# Patient Record
Sex: FEMALE | Race: WHITE
[De-identification: names, ages, dates, MRNs, and addresses within clinical notes are randomized per-mention and may not be internally consistent; named-entity substitution may affect disease eponyms.]

---

## 2018-05-29 ENCOUNTER — HOSPITAL ENCOUNTER (EMERGENCY)
Dept: HOSPITAL 39 - ER | Age: 68
Discharge: HOME | End: 2018-05-29
Payer: COMMERCIAL

## 2018-05-29 VITALS — OXYGEN SATURATION: 98 % | DIASTOLIC BLOOD PRESSURE: 73 MMHG | SYSTOLIC BLOOD PRESSURE: 176 MMHG

## 2018-05-29 VITALS — TEMPERATURE: 97.3 F

## 2018-05-29 DIAGNOSIS — R11.0: ICD-10-CM

## 2018-05-29 DIAGNOSIS — Z86.73: ICD-10-CM

## 2018-05-29 DIAGNOSIS — R10.13: Primary | ICD-10-CM

## 2018-05-29 DIAGNOSIS — Z87.891: ICD-10-CM

## 2018-05-29 DIAGNOSIS — I10: ICD-10-CM

## 2018-05-29 DIAGNOSIS — Z85.51: ICD-10-CM

## 2018-05-29 PROCEDURE — 85730 THROMBOPLASTIN TIME PARTIAL: CPT

## 2018-05-29 PROCEDURE — 82553 CREATINE MB FRACTION: CPT

## 2018-05-29 PROCEDURE — 84484 ASSAY OF TROPONIN QUANT: CPT

## 2018-05-29 PROCEDURE — 93005 ELECTROCARDIOGRAM TRACING: CPT

## 2018-05-29 PROCEDURE — 85610 PROTHROMBIN TIME: CPT

## 2018-05-29 PROCEDURE — 82550 ASSAY OF CK (CPK): CPT

## 2018-05-29 PROCEDURE — 85025 COMPLETE CBC W/AUTO DIFF WBC: CPT

## 2018-05-29 PROCEDURE — 81001 URINALYSIS AUTO W/SCOPE: CPT

## 2018-05-29 PROCEDURE — 36415 COLL VENOUS BLD VENIPUNCTURE: CPT

## 2018-05-29 PROCEDURE — 74177 CT ABD & PELVIS W/CONTRAST: CPT

## 2018-05-29 PROCEDURE — 83690 ASSAY OF LIPASE: CPT

## 2018-05-29 PROCEDURE — 80076 HEPATIC FUNCTION PANEL: CPT

## 2018-05-29 PROCEDURE — 74019 RADEX ABDOMEN 2 VIEWS: CPT

## 2018-05-29 PROCEDURE — 80048 BASIC METABOLIC PNL TOTAL CA: CPT

## 2018-05-29 PROCEDURE — 80307 DRUG TEST PRSMV CHEM ANLYZR: CPT

## 2018-05-29 NOTE — RAD
EXAM DESCRIPTION: 



Abdomen Series



CLINICAL HISTORY: 



pain



COMPARISON: 



None.



FINDINGS: 



AP supine and upright views of the abdomen show a nonspecific,

nonobstructive bowel gas pattern with no evidence for free

intraperitoneal air.



Surgical clips from cholecystectomy are seen. 



No air-filled dilated loops of small bowel are seen. No

significant air-fluid levels are identified.

No obvious organomegaly is seen. No abnormal calcifications are

seen in the expected location of the renal collecting systems.



Single view of the chest shows cardiac silhouette and pulmonary

vasculature to be within normal limits. Lungs are normally

aerated and clear

. Remote appearing trauma to the right lower chest is seen.

Postsurgical changes to the lumbosacral spine are noted. Moderate

vascular calcifications are seen. Surgical suture line in the

lower pelvis is seen with surgical clips in the upper to left

pelvis.



IMPRESSION: 



Nonspecific abdominal series



Electronically signed by:  Hardy Rodgers MD  5/29/2018 2:23 PM CDT

Workstation: [id number]

## 2018-05-29 NOTE — ED.PDOC
History of Present Illness





- General


Chief Complaint: Abdominal Pain


Stated Complaint: abdominal pain


Time Seen by Provider: 05/29/18 13:33


Information Source: patient


Exam Limitations: no limitations





- History of Present Illness


Initial Comments: 





Veronique rushing 66 y/o female came to ER with worsening sharp constant epigastric 

pain for the last 3 days.Felt nauseated but no vomiting or diarrhea,regular BM ,

no dysuria,no hematemesis or melena .Pain non radiating .


Abdominal Pain Onset Location: epigastric


Pain Radiation: no radiation


Quality: sharpness, steady


Timing/Duration: other - 3 days


Improving Factors: nothing


Worsening Factors: nothing


Associated Symptoms: other - see hpi





Review of Systems





- Review of Systems


Constitutional: States: no symptoms reported


EENTM: States: no symptoms reported


Respiratory: States: no symptoms reported


Cardiology: States: no symptoms reported


Gastrointestinal/Abdominal: States: see HPI


Genitourinary: States: no symptoms reported


All other Systems: Reviewed and Negative, No Change from Baseline





Past Medical History (General)





- Patient Medical History


Hx Stroke: Yes


Hx Hypertension: Yes


Hx Cancer: Yes - bladder


Surgical History: tonsillectomy - back/neck, other





- Vaccination History


Hx Influenza Vaccination: No


Hx Pneumococcal Vaccination: No





- Social History


Hx Tobacco Use: Yes


Hx Alcohol Use: No





Family Medical History





- Family History


  ** Mother


Family History: Unknown


Hx Family Hypertension: Yes - multiple family members


Hx Cardiac Disease: Yes - multiple family members





Physical Exam





- Physical Exam


General Appearance: Alert, Anxious, Comfortable, No apparent distress


Eyes, Ears, Nose, Throat Exam: PERRL/EOMI, normal ENT inspection


Neck: non-tender, full range of motion, supple, normal inspection


Respiratory: chest non-tender, lungs clear, normal breath sounds


Cardiovascular/Chest: normal peripheral pulses, regular rate, rhythm, no murmur


Peripheral Pulses: No deficit


Gastrointestinal/Abdominal: normal bowel sounds, soft, no organomegaly, no 

pulsatile mass, tenderness - mid abdomen noperitoneal signs


Back Exam: no CVA tenderness, no vertebral tenderness


Extremity: no pedal edema, no calf tenderness


Neurologic: no motor/sensory deficits, alert, oriented x 3





Progress





- Progress


Progress: 





05/29/18 15:02


 Vital Signs - 8 hr











  05/29/18 05/29/18





  13:15 14:15


 


Temperature 97.3 F L 


 


Pulse Rate [ 67 60





pulse ox]  


 


Respiratory 20 20





Rate  


 


Blood Pressure 179/70 176/70





[Left Arm]  


 


O2 Sat by Pulse 96 99





Oximetry  














- Results/Orders


Results/Orders: 





 





05/29/18 13:34


IV Care:Saline Lock per Protoc QSHIFT 





05/29/18 13:45


EKG STAT 





05/29/18 14:57


Hold Metformin x 48Hrs [id number] 





05/29/18 16:22


Pantoprazole Injection [Protonix IV] 80 mg   Sodium Chloride 0.9% 100Ml [NS (

NACL 0.9%) 100ml] 80 ml IVPB ONCE 








 Laboratory Results - last 24 hr











  05/29/18 05/29/18 05/29/18





  13:30 13:45 15:08


 


WBC   11.0 H 


 


RBC   4.29 


 


Hgb   13.2 


 


Hct   39.1 


 


MCV   91.2 


 


MCH   30.8 


 


MCHC   33.8 


 


RDW   15.3 H 


 


Plt Count   298 


 


MPV   6.7 L 


 


Absolute Neuts (auto)   7.60 H 


 


Absolute Lymphs (auto)   2.50 


 


Absolute Monos (auto)   0.70 


 


Absolute Eos (auto)   0.10 


 


Absolute Basos (auto)   0.00 


 


Neutrophils %   68.9 


 


Lymphocytes %   23.2 


 


Monocytes %   6.7 


 


Eosinophils %   0.9 L 


 


Basophils %   0.3 


 


PT   11.5 


 


INR   0.990 


 


PTT (SP)   31.6 


 


Sodium   137 


 


Potassium   4.2 


 


Chloride   102 


 


Carbon Dioxide   27 


 


Anion Gap   12.2 


 


BUN   14 


 


Creatinine   0.99 


 


BUN/Creatinine Ratio   14.1 


 


Random Glucose   84 


 


Serum Osmolality   273.5 L 


 


Calcium   9.1 


 


Magnesium   2.1 


 


Total Bilirubin   0.3 


 


Direct Bilirubin   < 0.1 


 


Indirect Bilirubin   0.2 


 


AST   16 


 


ALT   17 


 


Alkaline Phosphatase   85 


 


Creatine Kinase   141 H 


 


CK-MB (CK-2)   1.8 


 


CK-MB (CK-2) %   Not Reportable 


 


Troponin I   < 0.02 


 


Serum Total Protein   7.4 


 


Albumin   4.2 


 


Lipase   31 


 


Urine Color  Yellow  


 


Urine Appearance  Clear  


 


Urine pH  6.0  


 


Ur Specific Gravity  1.010  


 


Urine Protein  Negative  


 


Urine Glucose (UA)  Negative  


 


Urine Ketones  Negative  


 


Urine Blood  Trace-intact H  


 


Urine Nitrite  Negative  


 


Urine Bilirubin  Negative  


 


Urine Urobilinogen  0.2  


 


Ur Leukocyte Esterase  Trace H  


 


Urine RBC  0-1  


 


Urine WBC  0-1  


 


Ur Epithelial Cells  1-3  


 


Urine Bacteria  Rare  


 


Urine Opiates Screen    Positive H


 


Urine Barbiturates    Negative


 


Ur Phencyclidine Scrn    Negative


 


U Amphetamin/Meth Scrn    Negative


 


U Benzodiazepines Scrn    Negative


 


U Cocaine Metab Screen    Negative


 


U Cannabinoids Screen    Negative














- EKG/XRAY/CT


EKG: Sinus, no ST T wave changes


Comments: HR-60


XRAY: abdomen - non specific


CT Ordered: Yes - abd/p -thickening of the pylorus





Departure





- Departure


Clinical Impression: 


Abdominal pain


Qualifiers:


 Abdominal location: unspecified location Qualified Code(s): R10.9 - 

Unspecified abdominal pain





Time of Disposition: 16:33


Disposition: Discharge to Home or Self Care


Condition: Fair


Departure Forms:  ED Discharge - Pt. Copy, Patient Portal Self Enrollment


Instructions:  DI for Abdominal Pain-Adult, DI for Peptic Ulcer, Peptic Ulcer


Referrals: 


Gera Trammell MD [Primary Care Provider] - 1-2 Weeks


Prescriptions: 


Chlordiazepoxide HCl-Clidinium [Librax] 1 cap PO BID #14 cap


Ranitidine HCl [Acid Control Maximum Stre] 150 mg PO BID #60 tab


Sucralfate Suspension [Carafate Suspension] 1 gm PO ACHS #300 ml


Home Medications: 


Ambulatory Orders





Chlordiazepoxide HCl-Clidinium [Librax] 1 cap PO BID #14 cap 05/29/18 


Ranitidine HCl [Acid Control Maximum Stre] 150 mg PO BID #60 tab 05/29/18 


Sucralfate Suspension [Carafate Suspension] 1 gm PO ACHS #300 ml 05/29/18 








Additional Instructions: 


Need to follow up with primary Md for referral to GI specialist and Gynecologist

;Return to ER as needed

## 2018-05-29 NOTE — CT
EXAM DESCRIPTION: 

Abdomen/Pelvis w/Contrast: Computed Tomography.



CLINICAL HISTORY: 

Right upper quadrant abdominal pain. No prior abdominal surgery.

Previous hysterectomy.



COMPARISON: 

Abdominal radiographs on the same visit.



TECHNIQUE: 

Spiral-axial scans at 5.0 mm intervals through the abdomen and

pelvis, after nonionic IV contrast without oral contrast. 

Coronal and sagittal 2.0 mm reconstructions. No delayed scans. 

No adverse reactions.  Total Exam DLP: 861.84 mGy-cm.  This exam

was performed according to our departmental dose-optimization

program which includes automated exposure control, adjustment of

the mA and/or kV according to patient size and/or use of

iterative reconstruction technique; to reduce radiation dose to

as low as reasonably achievable (ALARA).



FINDINGS: 

Lung bases and pleura: Minimal dependent posterior atelectasis in

the bilateral lower lobes. No effusion.

Liver, Stomach, Spleen, Adrenal Glands: Minimal thickening of the

distal pyloric wall and the second and third segments of the

duodenum are dilated. No free air or fascial thickening or fatty

stranding. Minimal dilation of intrahepatic ducts. Adrenal gland

and spleen are negative.

Pancreas, Gallbladder, Ducts: Cholecystectomy with no fluid in

the gallbladder fossa. Minimal dilation common bile duct.

Pancreas and pancreatic duct are negative except for vascular

calcifications around the pancreas.

Kidneys and Ureters: 1.5 cm septated cyst in the left kidney.

Smaller bilateral cortical regions of nonenhancement may

represent smaller cysts, too small to be resolved by CT. Kidneys

otherwise negative.

Mesentery: No stranding free air or ascites.

Aorta: Moderate atherosclerotic calcification distally with

narrowing of the lumen and significant calcification extending

into the bilateral common iliac arteries.

Small Bowel: Fluid distally but no distention and no air-fluid

levels.

Terminal Ileum/Cecum: Normal caliber. Appendix contains gas with

normal caliber. Normal mesenteric density.

Colon: Scattered diverticula in the distal colon. No air-fluid

levels or obstruction. No complications.

Pelvic Organs: Vaginal cuff slightly prominent on the left of

midline impressing the left posterior base of the urinary

bladder. Also extending into the left adnexa. Minimal fluid in

the cul-de-sac.

Spine and Bony Pelvis: Anterior fusion construct L5-S1. Question

of interbody fusion versus calcified disc at T12-L1. Minimal

spondylosis included thoracic spine.

Abdominal Wall/Back Soft Tissues: Negative.



IMPRESSION: 

1. Questionable thickening of pyloric wall and proximal duodenum

with dilation of second and third segments of the duodenum. This

could represent intrinsic ulcer or viral process. No ascites or

free air. Transition to normal caliber at the fourth segment.

Fluid in the ileum but no air-fluid levels. Previous

cholecystectomy with physiologic dilation of the common bile duct

and intrahepatic ducts.

2. 1.5 cm septated cyst in the left kidney. Bilateral smaller

nonenhancing objects most likely cysts.

3. Atherosclerotic disease in the abdominal aorta and possible

significant narrowing bilateral common iliac arteries.

4. Mild diverticulosis in the distal colon but no evidence of

complications.

5. Asymmetric enlargement of the left vagina extending into the

left adnexa. Minimal fluid in the cul-de-sac. Consider ultrasound

follow-up.



Electronically signed by:  Song Hernandez MD  5/29/2018 4:09 PM CDT

Workstation: [id number]